# Patient Record
Sex: FEMALE | Race: OTHER | HISPANIC OR LATINO | URBAN - METROPOLITAN AREA
[De-identification: names, ages, dates, MRNs, and addresses within clinical notes are randomized per-mention and may not be internally consistent; named-entity substitution may affect disease eponyms.]

---

## 2020-01-01 ENCOUNTER — EMERGENCY (EMERGENCY)
Facility: HOSPITAL | Age: 55
LOS: 1 days | Discharge: HOME | End: 2020-01-01
Attending: EMERGENCY MEDICINE
Payer: COMMERCIAL

## 2020-01-01 VITALS
SYSTOLIC BLOOD PRESSURE: 109 MMHG | RESPIRATION RATE: 16 BRPM | OXYGEN SATURATION: 97 % | HEIGHT: 63 IN | TEMPERATURE: 98 F | HEART RATE: 79 BPM | WEIGHT: 160.06 LBS | DIASTOLIC BLOOD PRESSURE: 75 MMHG

## 2020-01-01 LAB
ALBUMIN SERPL ELPH-MCNC: 3.2 G/DL — LOW (ref 3.5–5)
ALP SERPL-CCNC: 123 U/L — HIGH (ref 40–120)
ALT FLD-CCNC: 39 U/L DA — SIGNIFICANT CHANGE UP (ref 10–60)
ANION GAP SERPL CALC-SCNC: 5 MMOL/L — SIGNIFICANT CHANGE UP (ref 5–17)
AST SERPL-CCNC: 18 U/L — SIGNIFICANT CHANGE UP (ref 10–40)
BILIRUB SERPL-MCNC: 0.5 MG/DL — SIGNIFICANT CHANGE UP (ref 0.2–1.2)
BUN SERPL-MCNC: 6 MG/DL — LOW (ref 7–18)
CALCIUM SERPL-MCNC: 8.3 MG/DL — LOW (ref 8.4–10.5)
CHLORIDE SERPL-SCNC: 111 MMOL/L — HIGH (ref 96–108)
CO2 SERPL-SCNC: 29 MMOL/L — SIGNIFICANT CHANGE UP (ref 22–31)
CREAT SERPL-MCNC: 0.72 MG/DL — SIGNIFICANT CHANGE UP (ref 0.5–1.3)
GLUCOSE SERPL-MCNC: 113 MG/DL — HIGH (ref 70–99)
HCT VFR BLD CALC: 38.4 % — SIGNIFICANT CHANGE UP (ref 34.5–45)
HGB BLD-MCNC: 12.5 G/DL — SIGNIFICANT CHANGE UP (ref 11.5–15.5)
MCHC RBC-ENTMCNC: 27.4 PG — SIGNIFICANT CHANGE UP (ref 27–34)
MCHC RBC-ENTMCNC: 32.6 GM/DL — SIGNIFICANT CHANGE UP (ref 32–36)
MCV RBC AUTO: 84 FL — SIGNIFICANT CHANGE UP (ref 80–100)
NRBC # BLD: 0 /100 WBCS — SIGNIFICANT CHANGE UP (ref 0–0)
PLATELET # BLD AUTO: 272 K/UL — SIGNIFICANT CHANGE UP (ref 150–400)
POTASSIUM SERPL-MCNC: 3.6 MMOL/L — SIGNIFICANT CHANGE UP (ref 3.5–5.3)
POTASSIUM SERPL-SCNC: 3.6 MMOL/L — SIGNIFICANT CHANGE UP (ref 3.5–5.3)
PROT SERPL-MCNC: 7.2 G/DL — SIGNIFICANT CHANGE UP (ref 6–8.3)
RBC # BLD: 4.57 M/UL — SIGNIFICANT CHANGE UP (ref 3.8–5.2)
RBC # FLD: 13.4 % — SIGNIFICANT CHANGE UP (ref 10.3–14.5)
SODIUM SERPL-SCNC: 145 MMOL/L — SIGNIFICANT CHANGE UP (ref 135–145)
WBC # BLD: 6.49 K/UL — SIGNIFICANT CHANGE UP (ref 3.8–10.5)
WBC # FLD AUTO: 6.49 K/UL — SIGNIFICANT CHANGE UP (ref 3.8–10.5)

## 2020-01-01 PROCEDURE — 36415 COLL VENOUS BLD VENIPUNCTURE: CPT

## 2020-01-01 PROCEDURE — 80053 COMPREHEN METABOLIC PANEL: CPT

## 2020-01-01 PROCEDURE — 85027 COMPLETE CBC AUTOMATED: CPT

## 2020-01-01 PROCEDURE — 96375 TX/PRO/DX INJ NEW DRUG ADDON: CPT

## 2020-01-01 PROCEDURE — 99284 EMERGENCY DEPT VISIT MOD MDM: CPT

## 2020-01-01 PROCEDURE — 96374 THER/PROPH/DIAG INJ IV PUSH: CPT

## 2020-01-01 PROCEDURE — 99284 EMERGENCY DEPT VISIT MOD MDM: CPT | Mod: 25

## 2020-01-01 RX ORDER — DIPHENHYDRAMINE HCL 50 MG
25 CAPSULE ORAL ONCE
Refills: 0 | Status: COMPLETED | OUTPATIENT
Start: 2020-01-01 | End: 2020-01-01

## 2020-01-01 RX ORDER — SODIUM CHLORIDE 9 MG/ML
1000 INJECTION INTRAMUSCULAR; INTRAVENOUS; SUBCUTANEOUS ONCE
Refills: 0 | Status: COMPLETED | OUTPATIENT
Start: 2020-01-01 | End: 2020-01-01

## 2020-01-01 RX ORDER — KETOROLAC TROMETHAMINE 30 MG/ML
15 SYRINGE (ML) INJECTION ONCE
Refills: 0 | Status: DISCONTINUED | OUTPATIENT
Start: 2020-01-01 | End: 2020-01-01

## 2020-01-01 RX ORDER — METOCLOPRAMIDE HCL 10 MG
10 TABLET ORAL ONCE
Refills: 0 | Status: COMPLETED | OUTPATIENT
Start: 2020-01-01 | End: 2020-01-01

## 2020-01-01 RX ADMIN — Medication 15 MILLIGRAM(S): at 23:07

## 2020-01-01 RX ADMIN — Medication 10 MILLIGRAM(S): at 23:07

## 2020-01-01 RX ADMIN — Medication 25 MILLIGRAM(S): at 23:07

## 2020-01-01 RX ADMIN — SODIUM CHLORIDE 1000 MILLILITER(S): 9 INJECTION INTRAMUSCULAR; INTRAVENOUS; SUBCUTANEOUS at 23:07

## 2020-01-01 NOTE — ED ADULT TRIAGE NOTE - CHIEF COMPLAINT QUOTE
" I have diarrhea and fever when I was in [facility], Friday I came back from [facility] I was fine but two days ago I have diarrhea and headache again "

## 2020-01-01 NOTE — ED ADULT NURSE NOTE - CHIEF COMPLAINT QUOTE
" I have diarrhea and fever when I was in [facility], Friday I came back from [facility] I was fine but two days ago I have diarrhea and headache again "

## 2020-01-01 NOTE — ED ADULT TRIAGE NOTE - NS ED NURSE BANDS TYPE
"[facility], he reports he saw patient this morning. Patient has been to dialysis in about 3 weeks. [facility] reports patient has symptoms of CHF, legs are "" leaking \"" fluid and BP is 198/80. [facility] tried to convince patient o go to the emergency department but so far patient has refused. [facility] is going to see patient again this afternoon to try and convince him to go the ED. Mayito Chong reports patient will need a medication reconciliation done after any hospital visit because often his medications are changed or held and this causes problems once patient is discharged home.   " Name band;

## 2020-01-02 VITALS
HEART RATE: 65 BPM | RESPIRATION RATE: 16 BRPM | SYSTOLIC BLOOD PRESSURE: 108 MMHG | OXYGEN SATURATION: 98 % | DIASTOLIC BLOOD PRESSURE: 71 MMHG | TEMPERATURE: 98 F

## 2020-01-02 NOTE — ED PROVIDER NOTE - OBJECTIVE STATEMENT
53 yo F [facility] of [facility] presents with HA x 2 days, gradual onset, diffuse throughout entire head, mild in nature, gradual onset. Also with watery non bloody diarrhea x a few days after traveling to [facility]. Denies other acute complaints.

## 2020-01-02 NOTE — ED PROVIDER NOTE - CLINICAL SUMMARY MEDICAL DECISION MAKING FREE TEXT BOX
53 yo F with HA. Normal neuro exam. Likely in the setting of dehydration and viral diarrhea. Plan - labs, meds, reassess.

## 2020-01-02 NOTE — ED PROVIDER NOTE - NS ED ROS FT
CONSTITUTIONAL: no fever, no chills   EYES: no visual changes, no eye pain   ENMT: no nasal congestion, no throat pain  CARDIOVASCULAR: no chest pain, no edema, no palpitations   RESPIRATORY: no shortness of breath, no cough   GASTROINTESTINAL: no abdominal pain, no nausea, no vomiting, +diarrhea, no constipation   GENITOURINARY: no dysuria, no frequency  MUSCULOSKELETAL: no joint pains, no myalgias, no back pain   SKIN: no rashes  NEUROLOGICAL: no weakness, +headache, no dizziness, no slurred speech, no syncope   PSYCHIATRIC: no known mental health illness   HEME/LYMPH: no lymphadenopathy      All other ROS negative except as per HPI

## 2020-01-02 NOTE — ED PROVIDER NOTE - PROGRESS NOTE DETAILS
labs - no significant dehydration or electrolyte abnormalities   Feeling better after meds. Repeat neuro exam wnl. Will dc. Discussed indications for patient return to ED. Patient understood.

## 2020-01-02 NOTE — ED PROVIDER NOTE - NSFOLLOWUPINSTRUCTIONS_ED_ALL_ED_FT
Dolor de lian general sin causa  General Headache Without Cause  El dolor de lian es un dolor o malestar que se siente en la jose de la lian o del akl. Hay muchas causas y tipos de willie de lian. En algunos casos, es posible que no se encuentre la causa.  Siga estas indicaciones en campos casa:  Controle campos afección para detectar cualquier cambio. Infórmele a campos médico acerca de los cambios. Siga estos pasos para controlar campos afección:  Control del dolor               [place] los medicamentos de venta nicolette y los recetados solamente jacquie se lo haya indicado el médico.Cuando sienta dolor de lian acuéstese en un cuarto oscuro y tranquilo.Si se lo indican, aplíquese hielo en la lian y en la jose del kal:  Ponga el hielo en eula bolsa plástica.Coloque eula toalla entre la piel y la bolsa.Coloque el hielo brendan 20 minutos, 2 a 3 veces al día.Si se lo indican, aplique calor en la jose afectada. Use la susana de calor que el médico le recomiende, jacquie eula compresa de calor húmedo o eula almohadilla térmica.   Coloque eula toalla entre la piel y la susana de calor. Aplique calor brendan 20 a 30 minutos. Retire la susana de calor si la piel se pone de color shabazz brillante. Byars es muy importante si no puede sentir dolor, calor o frío. Puede correr un riesgo mayor de sufrir quemaduras.Mantenga las luces tenues si las luces brillantes le molestan o kerry willie de lian empeoran.Comida y bebida     Mantenga un horario para las comidas.Si lyudmila alcohol:   Limite la cantidad que lyudmila a lo siguiente:   De 0 a 1 medida por día para las mujeres. De 0 a 2 medidas por día para los hombres. Esté atento a la cantidad de alcohol que hay en las bebidas que dodie. En los [place], eula medida equivale a eula botella de cerveza de 12 oz (355 ml), un vaso de vino de 5 oz (148 ml) o un vaso de eula bebida alcohólica de gricelda graduación de 1½ oz (44 ml).Deje de candace cafeína o reduzca la cantidad que consume.Indicaciones generales        Lleve un registro diario para averiguar si ciertas cosas provocan los willie de lian. Registre, por ejemplo, lo siguiente:  Lo que usted come y lyudmila.El tiempo que duerme.Algún cambio en campos dieta o en los medicamentos.Hágase masajes o pruebe otras formas de relajarse.Limite el estrés.Siéntese con la espalda recta. No contraiga (tensione) los músculos.No consuma ningún producto que contenga nicotina o tabaco. Estos incluyen los cigarrillos, el tabaco para mascar y los cigarrillos electrónicos. Si necesita ayuda para dejar de fumar, consulte al médico.Carlotta ejercicios con regularidad ananth jacquie se lo indicó el médico.Duerma lo suficiente. Byars a menudo significa entre 7 y 9 horas de sueño cada noche.Concurra a todas las visitas de control jacquie se lo haya indicado el médico. Byars es importante.Comuníquese con un médico si:  Los medicamentos no logran aliviar los síntomas.Tiene un dolor de lian que es diferente a los otros willie de lian.Tiene malestar estomacal (náuseas) o vomita.Tiene fiebre.Solicite ayuda inmediatamente si:  El dolor de lian empeora rápidamente.El dolor empeora después de hacer mucha actividad física.Sigue vomitando.Presenta rigidez en el kal.Tiene dificultad para sandie.Tiene dificultad para hablar.Siente dolor en el riya o en el oído.Kerry músculos están débiles, o pierde el control muscular.Pierde el equilibrio o tiene problemas para caminar.Siente que va a desvanecerse (perder el conocimiento) o se desmaya.Está desorientado (confundido).Tiene eula convulsión.Resumen  El dolor de lian es un dolor o malestar que se siente en la jose de la lian o del kal.Hay muchas causas y tipos de willie de lian. En algunos casos, es posible que no se encuentre la causa.Lleve un diario jacquie ayuda para determinar la causa de los willie de lian. Controle campos afección para detectar cualquier cambio. Infórmele a campos médico acerca de los cambios.Comuníquese con un médico si tiene un dolor de lian que es diferente de lo habitual o si el dolor de lian no se patricio con los medicamentos.Solicite ayuda de inmediato si el dolor de lian es muy intenso, vomita, tiene dificultad para sandie, pierde el equilibrio o tiene eula convulsión.Esta información no tiene jacquie fin reemplazar el consejo del médico. Asegúrese de hacerle al médico cualquier pregunta que tenga.    Document Released: 03/11/2013 Document Revised: 08/21/2019 Document Reviewed: 08/21/2019  Elsevier Interactive Patient Education © 2019 [employer]

## 2020-01-02 NOTE — ED PROVIDER NOTE - PATIENT PORTAL LINK FT
You can access the FollowMyHealth Patient Portal offered by [facility] by registering at the following website: [email or web address]. By joining [employer]’s FollowMyHealth portal, you will also be able to view your health information using other applications (apps) compatible with our system.

## 2024-12-20 NOTE — ED PROVIDER NOTE - CONDITION AT DISCHARGE:
Case was discussed with MYNOR and the patient's admission status was agreed to be Admission Status: observation status to the service of Dr. Meneses.   Improved